# Patient Record
Sex: FEMALE
[De-identification: names, ages, dates, MRNs, and addresses within clinical notes are randomized per-mention and may not be internally consistent; named-entity substitution may affect disease eponyms.]

---

## 2017-10-18 ENCOUNTER — HOSPITAL ENCOUNTER (EMERGENCY)
Dept: HOSPITAL 14 - H.EROB2 | Age: 22
Discharge: HOME | End: 2017-10-18
Payer: MEDICAID

## 2017-10-18 VITALS — DIASTOLIC BLOOD PRESSURE: 52 MMHG | HEART RATE: 89 BPM | SYSTOLIC BLOOD PRESSURE: 90 MMHG

## 2017-10-18 VITALS — BODY MASS INDEX: 20.9 KG/M2

## 2017-10-18 DIAGNOSIS — O36.8120: ICD-10-CM

## 2017-10-18 DIAGNOSIS — R10.2: ICD-10-CM

## 2017-10-18 DIAGNOSIS — O26.92: Primary | ICD-10-CM

## 2017-10-18 DIAGNOSIS — Z3A.26: ICD-10-CM

## 2017-10-18 NOTE — OBHP
===========================

Datetime: 10/18/2017 16:51

===========================

   

IP Adm Impression:  , intrauterine pregnancy; No Active Labor

IP Admit Plan:  Observation/Evaluation; Discharge home

Admit Comment, IP Provider:  Patient is a 22-year-old  2 para 1 estimated gestational age 26 w
eeks patient presents to the emergency department complaining of decreased fetal movement lower abdom
inal discomfort for several days patient denies any dysuria frequency hesitancy patient denies any va
ginal bleeding patient reports her prenatal care has been intermittent due to insurance issues.

   Past medical history asthma

   Medications inhaler

   Social history denies alcohol tobacco use

   Prenatal care previous normal spontaneous vaginal delivery 2016 male infant full-term

   Prenatal care with Dr. Rosales Graham

      

   Review of systems patient denies headache chest pain shortness of breath palpitations nausea vomit
ing diarrhea vaginal bleeding. Cold intolerance easy bruisability musculoskeletal or neurological com
plaints

      

   Vital signs stable afebrile

   Physical exam see notes

      

   Intrauterine pregnancy at 26 weeks

   Maternal discomfort related to intrauterine gestation

   Bedside sonogram vertex presentation and adequate fluid good fetal movement. Fetal tone

   Patient was advised to follow up with Dr. Rosales Graham

    labor precautions provided

      

      

Pelvic Type - PN:  Adequate

Extremities - PN:  Normal

Abdomen - PN:  Normal

Back - PN:  Normal

Breast - PN:  Normal

Lungs - PN:  Normal

Heart - PN:  Normal

Thyroid - PN:  Normal

Neurologic - PN:  Normal

HEENT - PN:  Normal

General - PN:  Normal

FHR - Baseline A Provider:  145

Gestation - Est Wks by US:  26.0

Pool Provider:  Negative

EGA AdmitDate IP:  27.2

Vital Signs Provider:  Reviewed

IP Chief Complaint:  Maternal discomfort

NICHD Variability Prov Fetus A:  Moderate 6-25bpm

NICHD Accel Fetus A IP Provider:  10X10

FHR Category Provider Fetus A:  Category I

NICHD Decel Fetus A IP Provider:  None

Dilatation, Provider:  0

Effacement, Provider:  0

Station, Provider:  0

Genitourinary Exam:  Normal

DTRs - PN:  Normal

## 2018-01-07 ENCOUNTER — HOSPITAL ENCOUNTER (EMERGENCY)
Dept: HOSPITAL 14 - H.EROB2 | Age: 23
Discharge: HOME | End: 2018-01-07
Payer: MEDICAID

## 2018-01-07 VITALS
OXYGEN SATURATION: 99 % | SYSTOLIC BLOOD PRESSURE: 107 MMHG | HEART RATE: 95 BPM | TEMPERATURE: 98.2 F | RESPIRATION RATE: 17 BRPM | DIASTOLIC BLOOD PRESSURE: 62 MMHG

## 2018-01-07 VITALS — BODY MASS INDEX: 23.6 KG/M2

## 2018-01-07 DIAGNOSIS — O26.93: ICD-10-CM

## 2018-01-07 DIAGNOSIS — R10.2: ICD-10-CM

## 2018-01-07 DIAGNOSIS — O47.1: Primary | ICD-10-CM

## 2018-01-07 DIAGNOSIS — Z3A.38: ICD-10-CM

## 2018-01-10 ENCOUNTER — HOSPITAL ENCOUNTER (INPATIENT)
Dept: HOSPITAL 14 - H.EROB2 | Age: 23
LOS: 3 days | Discharge: HOME | DRG: 373 | End: 2018-01-13
Attending: OBSTETRICS & GYNECOLOGY | Admitting: OBSTETRICS & GYNECOLOGY
Payer: MEDICAID

## 2018-01-10 VITALS — BODY MASS INDEX: 23.6 KG/M2

## 2018-01-10 DIAGNOSIS — K59.00: ICD-10-CM

## 2018-01-10 DIAGNOSIS — Z3A.39: ICD-10-CM

## 2018-01-10 LAB
BASOPHILS # BLD AUTO: 0 K/UL (ref 0–0.2)
BASOPHILS NFR BLD: 0.3 % (ref 0–2)
EOSINOPHIL # BLD AUTO: 0.1 K/UL (ref 0–0.7)
EOSINOPHIL NFR BLD: 0.8 % (ref 0–4)
ERYTHROCYTE [DISTWIDTH] IN BLOOD BY AUTOMATED COUNT: 13.8 % (ref 11.5–14.5)
HGB BLD-MCNC: 11.5 G/DL (ref 12–16)
LYMPHOCYTES # BLD AUTO: 1.5 K/UL (ref 1–4.3)
LYMPHOCYTES NFR BLD AUTO: 13.2 % (ref 20–40)
MCH RBC QN AUTO: 28.9 PG (ref 27–31)
MCHC RBC AUTO-ENTMCNC: 32.7 G/DL (ref 33–37)
MCV RBC AUTO: 88.4 FL (ref 81–99)
MONOCYTES # BLD: 0.9 K/UL (ref 0–0.8)
MONOCYTES NFR BLD: 7.5 % (ref 0–10)
NEUTROPHILS # BLD: 9 K/UL (ref 1.8–7)
NEUTROPHILS NFR BLD AUTO: 78.2 % (ref 50–75)
NRBC BLD AUTO-RTO: 0 % (ref 0–0)
PLATELET # BLD: 202 K/UL (ref 130–400)
PMV BLD AUTO: 9.5 FL (ref 7.2–11.7)
RBC # BLD AUTO: 3.99 MIL/UL (ref 3.8–5.2)
WBC # BLD AUTO: 11.6 K/UL (ref 4.8–10.8)

## 2018-01-10 PROCEDURE — 4A1HXCZ MONITORING OF PRODUCTS OF CONCEPTION, CARDIAC RATE, EXTERNAL APPROACH: ICD-10-PCS | Performed by: OBSTETRICS & GYNECOLOGY

## 2018-01-11 LAB
ERYTHROCYTE [DISTWIDTH] IN BLOOD BY AUTOMATED COUNT: 13.5 % (ref 11.5–14.5)
HGB BLD-MCNC: 11.5 G/DL (ref 12–16)
MCH RBC QN AUTO: 28.6 PG (ref 27–31)
MCHC RBC AUTO-ENTMCNC: 32.4 G/DL (ref 33–37)
MCV RBC AUTO: 88.5 FL (ref 81–99)
PLATELET # BLD: 181 K/UL (ref 130–400)
RBC # BLD AUTO: 4 MIL/UL (ref 3.8–5.2)
WBC # BLD AUTO: 17.9 K/UL (ref 4.8–10.8)

## 2018-01-12 NOTE — OBPPN
===========================

Datetime: 2018 06:37

===========================

   

PP Pain Prov:  Within normal limits

PP Nausea Prov:  Denies

PP Flatus Prov:  Yes

PP BM Prov:  No

PP Breasts Prov:  Not Done

PP Heart Prov:  Normal

PP Lungs Prov:  Normal

PP Abdomen/Uterus Prov:  Normal

PP Lochia Prov:  Normal

PP Vulva/Perineum Prov:  Not Done

PP CVA Tenderness Prov:  Normal

PP Extremities Prov:  Normal

PP C/S Incision Prov:  Not Applicable

PP Breastfeeding Progress Prov:  Abnormal

PP Comments Phys Exam Prov:  pt elects to only formula feed.

PP Impression Prov:  Normal postpartum progression

PP Plan Prov:  Continue present management

PP Progress Note Prov:   PPD 1 

   S:  23 yo  s/p NVD on 2018 at 01:57. Pt. is seen and examined at bedside this AM. No 
overnight events. Pt reports occasional abdominal pain, but well controlled with pain meds. Pt is amb
ulating without any difficulties. Bottle feeding baby. Tolerating PO diet. Lochia is similar to light
 menses in volume. Voiding freely, no bowel movement, but passing gas per rectum. Denies fever, chill
s, diarrhea, nausea, vomiting, chest pain, dyspnea, and dizziness.  

   O:  

   VS: stable  

   GEN: NAD  

   Cardio: S1S2, no M/G/R  

   Resp: clear breath sounds b/l  

   Abdomen: BS+, NT, Uterus is firm and at the level of the umbilicus.  

   EXT: No edema, calves nontender  

   NEURO/PSYCHI: AAOx3, no grossly focal deficit, preserved affect and mood.  

   Assessment/Plan:  23 yo  s/p NVD on 2018 at 01:57. Pt remains afebrile, tolerating pa
in with medication, tolerating PO intake, doing well on PPD1. OOB with caution  

   SCDs for DVT prophylaxis, pt ambulating  

   Ibuprofen 600mg for pain.  

   Colace 100mg PO BID/Senokot 17.2 mg qHS for constipation  

   Encourage ambulating  and consider breastfeeding

   F/u CBC post-delivery: pending  

   Anticipated d/c to home, 2018.  

   Case dw OB attending  

   --- Jaime Sheehan MD PGY-1

   OB Hospitalist Addendum:  Pt seen and examined by me.  Agree w/ above.  PPD1 s/p , doing well, 
bottle feeding.  Continue current care.  (ES)   

IP PP Procedures:  None

Vital Signs Provider PP:  Reviewed; Within Normal Limits

## 2018-01-13 VITALS
RESPIRATION RATE: 20 BRPM | DIASTOLIC BLOOD PRESSURE: 63 MMHG | HEART RATE: 99 BPM | OXYGEN SATURATION: 99 % | TEMPERATURE: 99.2 F | SYSTOLIC BLOOD PRESSURE: 106 MMHG

## 2018-01-13 NOTE — OBPPN
===========================

Datetime: 2018 07:30

===========================

   

PP Pain Prov:  Within normal limits

PP Nausea Prov:  Denies

PP Flatus Prov:  Yes

PP BM Prov:  Yes

PP Breasts Prov:  Not Done

PP Heart Prov:  Normal

PP Lungs Prov:  Normal

PP Abdomen/Uterus Prov:  Normal

PP Lochia Prov:  Normal

PP Vulva/Perineum Prov:  Not Done

PP CVA Tenderness Prov:  Normal

PP Extremities Prov:  Normal

PP C/S Incision Prov:  Not Applicable

PP Breastfeeding Progress Prov:  Abnormal

PP Comments Phys Exam Prov:  pt prefers to not breast feed. educated and encouraged pt with benefits 
of breast milk/colostrum

PP Impression Prov:  Normal postpartum progression

PP Plan Prov:  Discharge

PP Progress Note Prov:   PPD 2  

   S:  23 yo  s/p NVD on 2018 at 01:57. Pt. is seen and examined at bedside this AM. No 
overnight events. Pt reports occasional abdominal pain, but well controlled with pain meds. Pt is amb
ulating without any difficulties. Bottle feeding baby. Tolerating PO diet. Lochia is similar to light
 menses in volume. Voiding freely, with bowel movement, and passing gas per rectum. Denies fever, chi
lls, diarrhea, nausea, vomiting, chest pain, dyspnea, and dizziness.   

   O:   

   VS: stable   

   GEN: NAD   

   Cardio: S1S2, no M/G/R   

   Resp: clear breath sounds b/l   

   Abdomen: BS+, NT, Uterus is firm and at the level of the umbilicus.   

   EXT: No edema, calves nontender   

   NEURO/PSYCHI: AAOx3, no grossly focal deficit, preserved affect and mood.   

   Assessment/Plan:  23 yo  s/p NVD on 2018 at 01:57. Pt remains afebrile, tolerating pa
in with medication, tolerating PO intake, doing well on PPD2. OOB with caution   

   SCDs for DVT prophylaxis, pt ambulating   

   Ibuprofen 600mg for pain.   

   Colace 100mg PO BID/Senokot 17.2 mg qHS for constipation   

   Encourage ambulating and consider breastfeeding 

   F/u CBC post-delivery: 11.5/35.4  

   Anticipated d/c to home, 2018.   

   Case dw OB attending   

   --- Jaime Sheehan MD PGY-1 

   obh addendum:

   pt seen _ examined by me. agree w/ above assessment and plan.

   benefits of breastfeeding d/w pt.

   interested in depo- alt forms of contracep d/w pt.

IP PP Procedures:  None

Vital Signs Provider PP:  Reviewed; Within Normal Limits

## 2018-01-13 NOTE — OBDCSUM
===========================

Datetime: 2018 08:26

===========================

   

Discharged to, Provider:  Home

Follow up at, Provider:  [facility]

Disch Instr Activity:  Normal activity; May Shower

Disch Instr Diet:  Regular

Discharge Instructions, Provider:  Routine instructions given

Discharge Diagnosis, Provider:  Term Pregnancy Delivered

Discharge Time:  2018 10:00

Follow up in weeks, Provider:  4-6 weeks pp

Disch Referrals:  None

Contraception discussed, Prov:  Yes

Disch Activity Restrictions:  No sexual activity; Nothing in vagina - [place], tampons, douche

Discharge Comment, Provider:  Discharge Summary 

   DOA: 2018 

   EGA: 39.2 

   Diagnosis: NVD 

   Term 

   Risk factors: none 

   Summary of pregnancy: 21 yo F  

   L_D summary 

   DOL: 2018 at 01:57 

   NVD 

   NB: M 

   APGAR: 8/9 

   Weight: 3230g 

   PP summary 

   No serious complications during PP. Lochia= menses, mild pain, controlled with medications 

   Rubella immune, Tdap  

   Blood type: O+ 

   CBC pp: 11.5/35.4 

   Discharge Date: 2018 

   Time 10:00 AM 

   Discharge Instructions: 

   -encourage breastfeeding 

   -Ibuprofen for pain PRN 

   -Senokot 17.2 mg qHS for constipation 

   -Ambulate as tolerated 

   -f/u NB visit 3-7 days w/ pediatrician and PP visit 4-6 weeks with OB  

   case dw OB attending

   --- Jaime Sheehan MD PGY-1 

   benefits of breastfeeding d/w pt.

   interested in depo- alt forms of contracep d/w pt.

Contraception after Delivery:  Depo-Provera

   

===========================

Datetime: 2018 13:00

===========================

   

Disch Instr Activity:  Normal activity